# Patient Record
(demographics unavailable — no encounter records)

---

## 2025-03-06 NOTE — ASSESSMENT
[FreeTextEntry1] : 68 -yo male with h/o HTN, hyperlipidemia and DM, CAD  Stress ECHO in 2020 showed excellent exercise capacity, normal LVEF, no evidence of  ischemia  09/4/20 CCTA Calcium score 797.  Plan:  Increase Atorvastatin to 40 mg daily (will discuss with PCP). Continue treatment for HTN. Clopidogrel substituted for ASA due to GERD. No cardiac contraindications to circumcision. Patient advised to hold Clopidogrel for 5 days. Blood work and f/u in 6 months.  Blayne Castellanos MD

## 2025-03-06 NOTE — HISTORY OF PRESENT ILLNESS
[FreeTextEntry1] : 68-yo male with h/o HTN, HLD, DM.   Patient presents for a follow up visit. He reports feeling well. Denies CP or SOB.  Pt walks 4 miles per day without angina, no edema   2/27/25  GFR 93  Trig 61 on atorv 20 pt didnt increase atorv to 40mg  Hb A1c 6.5  TSH 1.11 1/06/20 Exercise Stress Echo EF no ischemia Normal LV function  09/4/20 CCTA Calcium score 797.

## 2025-03-06 NOTE — CARDIOLOGY SUMMARY
[de-identified] : 3/6/25 SR, ISAIAH, MALORIE. [de-identified] : Stress ECHO 01/06/20: 12.9 METs, no ischemia LVEF 62%, normal diastolic function Mild TR.